# Patient Record
(demographics unavailable — no encounter records)

---

## 2025-02-13 NOTE — ASSESSMENT
[FreeTextEntry1] : #HIV-Asymptomatic currently -On biktarvy. -Recheck T cells/VL and Hep C VL today.  #HTN, BPH, History of CVA -Management as per the primary care physician.  #HCM Reports having colonoscopy in the past. Should get routine low dose annual CT chests yearly for smoking history. Discussed that since he has a history of anal warts, he should see colorectal surgeon as well for anal pap smears. (History of MSM) Vaccinations as per the PCP. Reports getting Tdap, PCV 20, Shingrix. Referral to colorectal surgery for Anal pap smear/anoscopy due to history of anal warts.

## 2025-02-13 NOTE — HISTORY OF PRESENT ILLNESS
[FreeTextEntry1] : Denies any acute complaints. Reports feeling constipated. No changes in diet. No weight loss.

## 2025-02-13 NOTE — PHYSICAL EXAM
[General Appearance - Alert] : alert [FreeTextEntry1] : Chronically ill looking. On wheel chair bound.  [Respiration, Rhythm And Depth] : normal respiratory rhythm and effort [Heart Sounds] : normal S1 and S2 [Murmurs] : no murmurs [Edema] : there was no peripheral edema [Abdomen Soft] : soft [Abdomen Tenderness] : non-tender [] : no rash [Oriented To Time, Place, And Person] : oriented to person, place, and time